# Patient Record
Sex: MALE | Race: OTHER | HISPANIC OR LATINO | URBAN - METROPOLITAN AREA
[De-identification: names, ages, dates, MRNs, and addresses within clinical notes are randomized per-mention and may not be internally consistent; named-entity substitution may affect disease eponyms.]

---

## 2019-06-18 ENCOUNTER — EMERGENCY (EMERGENCY)
Facility: HOSPITAL | Age: 39
LOS: 0 days | Discharge: HOME | End: 2019-06-18
Admitting: STUDENT IN AN ORGANIZED HEALTH CARE EDUCATION/TRAINING PROGRAM
Payer: MEDICAID

## 2019-06-18 VITALS
HEART RATE: 89 BPM | TEMPERATURE: 98 F | OXYGEN SATURATION: 99 % | SYSTOLIC BLOOD PRESSURE: 136 MMHG | RESPIRATION RATE: 19 BRPM | DIASTOLIC BLOOD PRESSURE: 84 MMHG

## 2019-06-18 DIAGNOSIS — M25.529 PAIN IN UNSPECIFIED ELBOW: ICD-10-CM

## 2019-06-18 DIAGNOSIS — M25.521 PAIN IN RIGHT ELBOW: ICD-10-CM

## 2019-06-18 PROCEDURE — 73080 X-RAY EXAM OF ELBOW: CPT | Mod: 26,RT

## 2019-06-18 PROCEDURE — 99283 EMERGENCY DEPT VISIT LOW MDM: CPT

## 2019-06-18 RX ORDER — ACETAMINOPHEN 500 MG
1 TABLET ORAL
Qty: 21 | Refills: 0
Start: 2019-06-18 | End: 2019-06-24

## 2019-06-18 NOTE — ED PROVIDER NOTE - NS ED ROS FT
MS:  + elbow pain, No myalgia, muscle weakness,  back pain.  Neuro:  No headache or weakness.  No LOC.  Skin:  No skin rash.   Endocrine: No history of thyroid disease or diabetes.  Except as documented in the HPI,  all other systems are negative.

## 2019-06-18 NOTE — ED ADULT NURSE NOTE - CHPI ED NUR SYMPTOMS NEG
hard copy
no fever/no decreased eating/drinking/no chills/no dizziness/no tingling/no vomiting/no weakness/no nausea

## 2019-06-18 NOTE — ED ADULT NURSE NOTE - OBJECTIVE STATEMENT
The patient is a 38y Male complaining of right elbow pain for a few days. Patient denies any trauma or injury to right arm. Patient states pain started all of a sudden and is requesting a sling. Patient has ROM in right arm but pain when moving arm. Patient denies any chest pain, shortness of breath, recent fever or chills.

## 2019-06-18 NOTE — ED PROVIDER NOTE - CARE PROVIDER_API CALL
Stu Henderson (MD)  Orthopaedic Surgery  [address] [place] [address]  Phone: [phone]  Fax: [phone]  Follow Up Time:

## 2019-06-18 NOTE — ED PROVIDER NOTE - PHYSICAL EXAMINATION
VITAL SIGNS: I have reviewed nursing notes and confirm.  CONSTITUTIONAL: Well-developed; well-nourished; in no acute distress.   SKIN:  skin exam is warm and dry, no acute rash.    HEAD: Normocephalic; atraumatic.  EYES: conjunctiva and sclera clear.  EXT: mild edema to right elbow, no redness, pain worsened with pronation/supination Normal ROM.  No clubbing, cyanosis  NEURO: Alert, oriented, grossly unremarkable

## 2020-02-24 NOTE — ED PROVIDER NOTE - OBJECTIVE STATEMENT
Health Maintenance Due   Topic Date Due   • Pneumococcal Vaccine 0-64 (1 of 1 - PPSV23) 12/22/1987   • Varicella Vaccine (1 of 2 - 13+ 2-dose series) 12/22/1994   • Diabetes Eye Exam  12/22/1999       Patient is due for topics as listed above but is not proceeding with Immunization(s) Influenza, Pneumococcal and Varicella at this time.            Pt is a 39y/o male with no sig pmhx presents today for eval of atraumatic right elbow pain x 1 month, worsened with movement. Pt denies fever, chills, weakness, numbness, redness.

## 2021-04-21 ENCOUNTER — EMERGENCY (EMERGENCY)
Facility: HOSPITAL | Age: 41
LOS: 0 days | Discharge: HOME | End: 2021-04-21
Attending: EMERGENCY MEDICINE | Admitting: EMERGENCY MEDICINE
Payer: MEDICAID

## 2021-04-21 VITALS — HEART RATE: 98 BPM

## 2021-04-21 VITALS
DIASTOLIC BLOOD PRESSURE: 93 MMHG | SYSTOLIC BLOOD PRESSURE: 149 MMHG | HEART RATE: 113 BPM | OXYGEN SATURATION: 99 % | RESPIRATION RATE: 18 BRPM | WEIGHT: 160.06 LBS | TEMPERATURE: 98 F | HEIGHT: 68 IN

## 2021-04-21 DIAGNOSIS — K08.89 OTHER SPECIFIED DISORDERS OF TEETH AND SUPPORTING STRUCTURES: ICD-10-CM

## 2021-04-21 DIAGNOSIS — F17.200 NICOTINE DEPENDENCE, UNSPECIFIED, UNCOMPLICATED: ICD-10-CM

## 2021-04-21 DIAGNOSIS — K02.9 DENTAL CARIES, UNSPECIFIED: ICD-10-CM

## 2021-04-21 DIAGNOSIS — M54.9 DORSALGIA, UNSPECIFIED: ICD-10-CM

## 2021-04-21 DIAGNOSIS — Z88.8 ALLERGY STATUS TO OTHER DRUGS, MEDICAMENTS AND BIOLOGICAL SUBSTANCES: ICD-10-CM

## 2021-04-21 DIAGNOSIS — J45.909 UNSPECIFIED ASTHMA, UNCOMPLICATED: ICD-10-CM

## 2021-04-21 PROCEDURE — 99282 EMERGENCY DEPT VISIT SF MDM: CPT

## 2021-04-21 RX ORDER — ACETAMINOPHEN 500 MG
975 TABLET ORAL ONCE
Refills: 0 | Status: COMPLETED | OUTPATIENT
Start: 2021-04-21 | End: 2021-04-21

## 2021-04-21 RX ADMIN — Medication 975 MILLIGRAM(S): at 14:31

## 2021-04-21 NOTE — ED PROVIDER NOTE - CLINICAL SUMMARY MEDICAL DECISION MAKING FREE TEXT BOX
Pt presented for evaluation toothache and was transferred to dental after evaluation by PA and prior to my evaluation.

## 2021-04-21 NOTE — ED PROVIDER NOTE - OBJECTIVE STATEMENT
39 y/o male no Pmhx presents to the ED c/o "I have left lower dental pain for 1 week. My tooth is broken and coming apart piece by piece." no fever/ chills/ difficulty swallowing

## 2021-04-21 NOTE — CONSULT NOTE ADULT - SUBJECTIVE AND OBJECTIVE BOX
Patient is a 40y old  Male who presents with a chief complaint of dental pain in lower left quadrant. Claims his tooth fractured three weeks ago, and has since felt a painful sensation and mobility.     HPI:      PAST MEDICAL & SURGICAL HISTORY:  Asthma    No significant past surgical history      (   ) heart valve replacement  (   ) joint replacement  (   ) pregnancy    MEDICATIONS  (STANDING):    MEDICATIONS  (PRN):      Allergies    ibuprofen (Unknown)    Intolerances        FAMILY HISTORY:      *SOCIAL HISTORY: (   ) Tobacco; (   ) ETOH    *Last Dental Visit:    Vital Signs Last 24 Hrs  T(C): 36.9 (21 Apr 2021 14:03), Max: 36.9 (21 Apr 2021 14:03)  T(F): 98.5 (21 Apr 2021 14:03), Max: 98.5 (21 Apr 2021 14:03)  HR: 98 (21 Apr 2021 14:34) (98 - 113)  BP: 149/93 (21 Apr 2021 14:03) (149/93 - 149/93)  BP(mean): --  RR: 18 (21 Apr 2021 14:03) (18 - 18)  SpO2: 99% (21 Apr 2021 14:03) (99% - 99%)    LABS:                  EOE:  TMJ (  - ) clicks                     ( -  ) pops                     ( -  ) crepitus             Mandible <<FROM>>             Facial bones and MOM <<grossly intact>>             ( -  ) trismus             ( -  ) lymphadenopathy             ( -  ) swelling             ( -  ) asymmetry             ( -  ) palpation             ( -  ) dyspnea             ( -  ) dysphagia             ( -  ) loss of consciousness    IOE:  <<permanent/primary/mixed>> dentition: Multiple carious teeth.            hard/soft palate:  ( -  ) palatal torus, <<No pathology noted>>           tongue/FOM <<No pathology noted>>           labial/buccal mucosa <<No pathology noted>>           ( +  ) percussion           (  + ) palpation           (  - ) swelling            (  - ) abscess           (  - ) sinus tract            *DENTAL RADIOGRAPHS: 1 diagnostic periapical     RADIOLOGY & ADDITIONAL STUDIES:    *ASSESSMENT: Upon clinical and radiographic evaluation, #18 infected root tips noted. Additionally, decay into pulp of #19. #18 sensitive to percussion and palpation. Normal response to #19.       *PLAN: Informed patient #18 root tips are infected and require extraction. Patient opted to not extract. Will prescribe patient amoxicillin 500 mg. Instructed patient to return for extraction of #18 and comprehensive evaluation for further treatment.         RECOMMENDATIONS:  1) Complete course of antibiotics.   2) Dental F/U with outpatient dentist for comprehensive dental care.   3) If any difficulty swallowing/breathing, fever occur, return to ER.     Farhad Alvarez DDS